# Patient Record
(demographics unavailable — no encounter records)

---

## 2025-05-14 NOTE — HISTORY OF PRESENT ILLNESS
[FreeTextEntry1] : Very pleasant 39-year-old gentleman who presents for follow-up of erectile dysfunction.  He reports that Viagra 100 mg continues to significantly improve his erections.  He is very happy with his erections on the medication.  He wishes to continue this. He denies urinary problems.  He reports no side effects from the medication.  No other complaints.

## 2025-05-14 NOTE — ASSESSMENT
[FreeTextEntry1] : Very pleasant 39-year-old gentleman who presents for follow-up of erectile dysfunction -Continue sildenafil 100 mg-refill sent to the pharmacy -We discussed alternative options for management of erectile dysfunction.  At this time he is happy with sildenafil 100 mg as needed -Follow-up in 6 months or sooner if necessary  Patient is being seen today for evaluation and management of a chronic and longitudinal ongoing condition and I am of the primary treating physician